# Patient Record
Sex: MALE | Race: ASIAN | Employment: FULL TIME | URBAN - METROPOLITAN AREA
[De-identification: names, ages, dates, MRNs, and addresses within clinical notes are randomized per-mention and may not be internally consistent; named-entity substitution may affect disease eponyms.]

---

## 2019-12-04 ENCOUNTER — OFFICE VISIT (OUTPATIENT)
Dept: URGENT CARE | Facility: URGENT CARE | Age: 34
End: 2019-12-04
Payer: COMMERCIAL

## 2019-12-04 VITALS
SYSTOLIC BLOOD PRESSURE: 118 MMHG | OXYGEN SATURATION: 96 % | WEIGHT: 177 LBS | TEMPERATURE: 98.5 F | RESPIRATION RATE: 16 BRPM | HEART RATE: 62 BPM | DIASTOLIC BLOOD PRESSURE: 80 MMHG

## 2019-12-04 DIAGNOSIS — R09.82 POST-NASAL DRIP: Primary | ICD-10-CM

## 2019-12-04 DIAGNOSIS — R06.2 WHEEZING: ICD-10-CM

## 2019-12-04 DIAGNOSIS — R05.9 COUGH: ICD-10-CM

## 2019-12-04 PROCEDURE — 99203 OFFICE O/P NEW LOW 30 MIN: CPT | Mod: 25 | Performed by: PHYSICIAN ASSISTANT

## 2019-12-04 PROCEDURE — 94640 AIRWAY INHALATION TREATMENT: CPT | Performed by: PHYSICIAN ASSISTANT

## 2019-12-04 RX ORDER — AZITHROMYCIN 250 MG/1
TABLET, FILM COATED ORAL
Qty: 6 TABLET | Refills: 0 | Status: SHIPPED | OUTPATIENT
Start: 2019-12-04

## 2019-12-04 RX ORDER — CETIRIZINE HYDROCHLORIDE 10 MG/1
10 TABLET ORAL EVERY EVENING
Qty: 30 TABLET | Refills: 0 | Status: SHIPPED | OUTPATIENT
Start: 2019-12-04

## 2019-12-04 RX ORDER — ALBUTEROL SULFATE 0.83 MG/ML
2.5 SOLUTION RESPIRATORY (INHALATION) ONCE
Status: COMPLETED | OUTPATIENT
Start: 2019-12-04 | End: 2019-12-04

## 2019-12-04 RX ADMIN — ALBUTEROL SULFATE 2.5 MG: 0.83 SOLUTION RESPIRATORY (INHALATION) at 13:39

## 2019-12-04 NOTE — PATIENT INSTRUCTIONS
(R09.82) Post-nasal drip  (primary encounter diagnosis)  Comment:   Plan: cetirizine (ZYRTEC) 10 MG tablet, ALBUTEROL         UNIT DOSE, 1 MG            (R05) Cough  Comment:   Plan: azithromycin (ZITHROMAX Z-PETR) 250 MG tablet            (R06.2) Wheezing  Comment:   Plan: INHALATION/NEBULIZER TREATMENT, INITIAL,         albuterol (PROVENTIL) neb solution 2.5 mg            Follow up with primary clinic should symptoms persist or worsen.

## 2019-12-04 NOTE — PROGRESS NOTES
Patient presents with:  Urgent Care: cough and chest congestion for one month    SUBJECTIVE:   Francine Pink is a 34 year old male presenting with a chief complaint of   1) intermittently productive cough for the past month.  Denies any runny nose or congestion  2) scratchy throat  3) wheezing.    Onset of symptoms was as above  Course of illness is worsening.    Severity moderate  Current and Associated symptoms: as above  Treatment measures tried include mucinex and delsym.  Predisposing factors include none.  Quit smoking 2 years ago.      No flu shot this season.    SH: has been around some ill people at 2 funerals this past month.    No past medical history on file.     Denies any significant PMH    FH:  Denies any significant FH    There is no problem list on file for this patient.    Social History     Tobacco Use     Smoking status: Current Every Day Smoker     Packs/day: 0.25     Years: 5.00     Pack years: 1.25     Types: Cigarettes     Smokeless tobacco: Never Used   Substance Use Topics     Alcohol use: Not on file       ROS:  CONSTITUTIONAL:NEGATIVE for fever, chills, change in weight  INTEGUMENTARY/SKIN: NEGATIVE for worrisome rashes, moles or lesions  EYES: NEGATIVE for vision changes or irritation  ENT/MOUTH: as per HPI  RESP:as per HPI  CV: NEGATIVE for chest pain, palpitations or peripheral edema  GI: NEGATIVE for nausea, abdominal pain, heartburn, or change in bowel habits  : negative  MUSCULOSKELETAL: NEGATIVE for significant arthralgias or myalgia  NEURO: NEGATIVE for weakness, dizziness or paresthesias  Review of systems negative except as stated above.    OBJECTIVE  :/80   Pulse 62   Temp 94.5  F (34.7  C) (Tympanic)   Resp 16   Wt 80.3 kg (177 lb)   SpO2 96%   GENERAL APPEARANCE: healthy, alert and no distress  EYES: EOMI,  PERRL, conjunctiva clear  HENT: ear canals and TM's normal.  Nose and mouth without ulcers, erythema or lesions  HENT: nasal turbinates boggy with bluish hue  and rhinorrhea yellow  NECK: supple, nontender, no lymphadenopathy  RESP:wheezing which improves with neb in clinic  CV: regular rates and rhythm, normal S1 S2, no murmur noted  ABDOMEN:  soft, nontender, no HSM or masses and bowel sounds normal  NEURO: Normal strength and tone, sensory exam grossly normal,  normal speech and mentation  SKIN: no suspicious lesions or rashes    (R09.82) Post-nasal drip  (primary encounter diagnosis)  Comment: likely allergies  Plan: cetirizine (ZYRTEC) 10 MG tablet, ALBUTEROL         UNIT DOSE, 1 MG            (R05) Cough  Comment:   Plan: azithromycin (ZITHROMAX Z-PETR) 250 MG tablet            (R06.2) Wheezing  Comment:   Plan: INHALATION/NEBULIZER TREATMENT, INITIAL,         albuterol (PROVENTIL) neb solution 2.5 mg            Follow up with primary clinic should symptoms persist or worsen.    Patient expresses understanding and agreement with the assessment and plan as above.